# Patient Record
Sex: FEMALE | URBAN - METROPOLITAN AREA
[De-identification: names, ages, dates, MRNs, and addresses within clinical notes are randomized per-mention and may not be internally consistent; named-entity substitution may affect disease eponyms.]

---

## 2019-03-01 ENCOUNTER — TRANSFERRED RECORDS (OUTPATIENT)
Dept: HEALTH INFORMATION MANAGEMENT | Facility: CLINIC | Age: 39
End: 2019-03-01

## 2019-04-12 ENCOUNTER — TRANSFERRED RECORDS (OUTPATIENT)
Dept: HEALTH INFORMATION MANAGEMENT | Facility: CLINIC | Age: 39
End: 2019-04-12

## 2019-08-20 ENCOUNTER — TRANSFERRED RECORDS (OUTPATIENT)
Dept: HEALTH INFORMATION MANAGEMENT | Facility: CLINIC | Age: 39
End: 2019-08-20

## 2020-09-01 ENCOUNTER — TRANSFERRED RECORDS (OUTPATIENT)
Dept: HEALTH INFORMATION MANAGEMENT | Facility: CLINIC | Age: 40
End: 2020-09-01

## 2023-01-03 ENCOUNTER — TRANSFERRED RECORDS (OUTPATIENT)
Dept: HEALTH INFORMATION MANAGEMENT | Facility: CLINIC | Age: 43
End: 2023-01-03

## 2023-03-08 ENCOUNTER — TRANSFERRED RECORDS (OUTPATIENT)
Dept: HEALTH INFORMATION MANAGEMENT | Facility: CLINIC | Age: 43
End: 2023-03-08

## 2023-03-23 ENCOUNTER — MEDICAL CORRESPONDENCE (OUTPATIENT)
Dept: HEALTH INFORMATION MANAGEMENT | Facility: CLINIC | Age: 43
End: 2023-03-23

## 2023-03-29 ENCOUNTER — TRANSCRIBE ORDERS (OUTPATIENT)
Dept: OTHER | Age: 43
End: 2023-03-29

## 2023-03-29 DIAGNOSIS — Z98.890 S/P TYMPANOPLASTY: ICD-10-CM

## 2023-03-29 DIAGNOSIS — H72.93 BILATERAL TYMPANIC MEMBRANE PERFORATION: Primary | ICD-10-CM

## 2023-04-21 NOTE — TELEPHONE ENCOUNTER
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]; S/P tympanoplasty [Z98.890] bilateral tympanic membrane repairs in 2019, residual small perforations in 2020, Stable dry 10% perforation on the left-establishing care upon moving to [place] Referred by Minh Hatch @ [facility]     RECORDS REQUESTED FROM:       Clinic name Comments Records Status Imaging Status   [facility]  [address], [address]  [place] [address]   [phone]  Fax [phone] 1/3/23 and 3/8/23 OV note - Minh Hatch MD     Audiogram  1/3/23  9/1/20  3/1/19 + 8/20/19 Scanned in [facility] 12/1/21 note -Luis Angel Schulz MD   CE    Procedure 4/12/2019 Tympanoplasty, left  12/13/2019 Tympanoplasty, right Send to scan 6/16/23 April 21, 2023 at 6:57 PM - Req for more recs from [facility] clinic in [place] -[facility]  June 16, 2023 at 9:25 AM - Received from [facility] additional OV notes, op reports,  audio done prior to 2023 and send to scan -[facility]

## 2023-07-07 ENCOUNTER — PRE VISIT (OUTPATIENT)
Dept: OTOLARYNGOLOGY | Facility: CLINIC | Age: 43
End: 2023-07-07

## 2023-08-24 DIAGNOSIS — Z01.10 ENCOUNTER FOR HEARING TEST: Primary | ICD-10-CM

## 2023-08-25 ENCOUNTER — PRE VISIT (OUTPATIENT)
Dept: OTOLARYNGOLOGY | Facility: CLINIC | Age: 43
End: 2023-08-25

## 2023-08-25 ENCOUNTER — OFFICE VISIT (OUTPATIENT)
Dept: OTOLARYNGOLOGY | Facility: CLINIC | Age: 43
End: 2023-08-25
Payer: COMMERCIAL

## 2023-08-25 ENCOUNTER — OFFICE VISIT (OUTPATIENT)
Dept: AUDIOLOGY | Facility: CLINIC | Age: 43
End: 2023-08-25
Payer: COMMERCIAL

## 2023-08-25 VITALS
TEMPERATURE: 97.7 F | HEART RATE: 54 BPM | SYSTOLIC BLOOD PRESSURE: 119 MMHG | OXYGEN SATURATION: 98 % | HEIGHT: 65 IN | WEIGHT: 190 LBS | DIASTOLIC BLOOD PRESSURE: 78 MMHG | BODY MASS INDEX: 31.65 KG/M2

## 2023-08-25 DIAGNOSIS — Z98.890 S/P TYMPANOPLASTY: ICD-10-CM

## 2023-08-25 DIAGNOSIS — H90.0 CONDUCTIVE HEARING LOSS, BILATERAL: Primary | ICD-10-CM

## 2023-08-25 DIAGNOSIS — H72.93 BILATERAL TYMPANIC MEMBRANE PERFORATION: ICD-10-CM

## 2023-08-25 PROCEDURE — 99203 OFFICE O/P NEW LOW 30 MIN: CPT | Mod: 25 | Performed by: OTOLARYNGOLOGY

## 2023-08-25 PROCEDURE — 92550 TYMPANOMETRY & REFLEX THRESH: CPT | Mod: 52 | Performed by: AUDIOLOGIST

## 2023-08-25 PROCEDURE — 92557 COMPREHENSIVE HEARING TEST: CPT | Performed by: AUDIOLOGIST

## 2023-08-25 PROCEDURE — 92504 EAR MICROSCOPY EXAMINATION: CPT | Performed by: OTOLARYNGOLOGY

## 2023-08-25 RX ORDER — CETIRIZINE HYDROCHLORIDE 10 MG/1
10 TABLET ORAL DAILY
COMMUNITY

## 2023-08-25 ASSESSMENT — PAIN SCALES - GENERAL: PAINLEVEL: NO PAIN (0)

## 2023-08-25 NOTE — PROGRESS NOTES
AUDIOLOGY REPORT     SUMMARY: Audiology visit completed. See audiogram for results.       RECOMMENDATIONS: Follow-up with ENT.      INDIRA Johnson.  Audiology Doctoral Student  [place] #[id number]     I was present with the patient for the entire Audiology appointment including all procedures/testing performed by the AuD student, and agree with the student s assessment and plan as documented.      Ty Albright, [facility]  Licensed Audiologist  [place] #[id number]

## 2023-08-25 NOTE — LETTER
2023       RE: Matthew Benitez   [address] [place] [address]     Dear Colleague,    Thank you for referring your patient, Matthew Benitez, to the [facility] [place] at [facility]. Please see a copy of my visit note below.      Neurotology Clinic      Name: Matthew Benitez  MRN: [id number]  Age: 42 year old  : 1980  Referring provider: Minh Hatch MD of [facility]  23       Chief Complaint:   Consultation to establish care    History of Present Illness:   Matthew Benitez is a 42 year old female with a history of bilateral tympanic membrane perforations status post bilateral tympanic membrane repairs with Molt residual perforations who presents for consultation regarding establishing care upon a moved to [place].  Consultation was kindly requested by Dr. Minh Hatch at [facility].    Ms. Benitez has a history of eustachian tube dysfunction for which she underwent bilateral myringotomy and tube placement most recently as an adult.  She developed bilateral tympanic membrane perforations.  She subsequently underwent underwent cartilage tympanoplasties  (tragus) with bony canalplasty for large tympanic membrane perforations, starting with the left in early  followed by the right and late .  She experienced significant hearing improvement with the surgery.  At her most recent follow-up, she was noted to have very small bilateral residual perforations that were clean and dry.     About 2 to 3 times per ear she gets what she considers to be an ear infection characterized by achiness and pressure and, if it gets bad, drainage.  Benadryl can reverse the process. These episodes are usually treated with ear drops.  She has no specific concerns today about either ear and has not had drainage recently.     Review of Systems:   Pertinent items are noted in HPI or as in patient entered ROS below,  "remainder of complete ROS is negative.        No data to display                 Active Medications:     Current Outpatient Medications:     cetirizine (ZYRTEC) 10 MG tablet, Take 10 mg by mouth daily, Disp: , Rfl:     Ferrous Sulfate (IRON PO), Take 1 tablet by mouth daily, Disp: , Rfl:       Allergies:   Patient has no known allergies.      Past Medical History:  No past medical history on file.  There is no problem list on file for this patient.       Past Surgical History:  No past surgical history on file.  Bilateral ear surgeries, as above    Family History:   No family history on file.      Social History:   Social History     Tobacco Use    Smoking status: Never    Smokeless tobacco: Never   Substance Use Topics    Alcohol use: Yes     Alcohol/week: 2.0 standard drinks of alcohol     Types: 2 Standard drinks or equivalent per week        Physical Exam:   /78   Pulse 54   Temp 97.7  F (36.5  C)   Ht 1.651 m (5' 5\")   Wt 86.2 kg (190 lb)   SpO2 98%   BMI 31.62 kg/m     Constitutional:  The patient was unaccompanied, well-groomed, and in no acute distress.     Skin: Normal:  warm and pink without rash   Neurologic: Alert and oriented x 3.  CN's III-XII within normal limits.  Voice normal.    Psychiatric: The patient's affect was calm, cooperative, and appropriate.     Communication:  Normal; communicates verbally, normal voice quality.   Respiratory: Breathing comfortably without stridor or exertion of accessory muscles.    Eyes: Pupils were equal and reactive.  Extraocular movement intact.     Ears: Pinnae and tragus non-tender. .        Otologic microscope exam:  Right ear: Healthy EAC skin.  Reconstructed tympanic membrane is intact, opaque, without retraction.    Left ear: Healthy EAC skin. Reconstructed tympanic membrane with small slit like opening anterior inferiorly between the edge of the cartilage graft and the annulus, rest appears healthy, no drainage or " concerns.    Audiogram:  AUDIOGRAM: She underwent an audiogram today. This demonstrated:    Normal to mild conductive hearing loss bilaterally.   Tymps:   Right: WNL (A)  Left: abnormal (flat) with large ECV, consistent with a TM perforation.   Reflexes: Right ipsi and left contra absent, didn't test other conditions due to abnormal tymp on left.   Re: 1/3/23 thresholds largely improved compared to last test at outside clinic.    Right: Speech reception threshold is 10 dB with 100% word recognition. Tympanogram A type   Left: Speech reception threshold is 20 dB with 100% word recognition. Tympanogram B type     Audiogram was independently reviewed    Outside records from Dr. Hatch at [facility] were independently reviewed.  Key findings were summarized above.     Assessment and Plan:  Matthew Benitez is a 42 year old female with history of eustachian tube dysfunction and bilateral large tympanic membrane perforations, status post bilateral cartilage tympanoplasty with canalplasty in 2019.  Surgery resulted in significant hearing improvement bilaterally and the right tympanic membrane perforation noted at her last visit with Dr. Hatch has closed.  There is a stable small tympanic membrane perforation on the left which serve as pressure equalizer. No intervention needed today. I am happy to establish care with her and will be available to her as needed. We discussed the treatment of future left OM or OE with ototopical drops.     Thank you for the opportunity to participate in her care.    Thania Chavira MD  Otology & Neurotology  [facility]

## 2023-08-25 NOTE — NURSING NOTE
"Chief Complaint   Patient presents with    Consult     Bilateral  tympanic perforations      Blood pressure 119/78, pulse 54, temperature 97.7  F (36.5  C), height 1.651 m (5' 5\"), weight 86.2 kg (190 lb), SpO2 98 %.  Juan Dejesus LPN    "

## 2024-03-10 ENCOUNTER — HEALTH MAINTENANCE LETTER (OUTPATIENT)
Age: 44
End: 2024-03-10

## 2024-10-06 ENCOUNTER — HEALTH MAINTENANCE LETTER (OUTPATIENT)
Age: 44
End: 2024-10-06